# Patient Record
(demographics unavailable — no encounter records)

---

## 2025-02-03 NOTE — DISCUSSION/SUMMARY
[FreeTextEntry1] : per MOC She tried to get ENDO appt in the past for PMH elevated Hbb A1c  but got a "run around" so she stopped pursuing Advised ENDO ASAP and call us if unable to get appt in next 2 weeks

## 2025-05-23 NOTE — REASON FOR VISIT
Attending Attestation (For Attendings USE Only)...
[Consultation] : a consultation visit
[Consultation] : a consultation visit

## 2025-05-27 NOTE — CONSULT LETTER
[Please see my note below.] : Please see my note below. [Consult Closing:] : Thank you very much for allowing me to participate in the care of this patient.  If you have any questions, please do not hesitate to contact me. [Sincerely,] : Sincerely, [FreeTextEntry1] : Trupti Shi MD [FreeTextEntry3] : Chioma Montiel MD

## 2025-05-27 NOTE — HISTORY OF PRESENT ILLNESS
[Polyuria] : no polyuria [Polydipsia] : no polydipsia [FreeTextEntry2] : Lawrence is a 13 year 3 month boy, here with his mother, with a family history of type 2 diabetes, with elevated Hgb A1C.  His last Hgb A1C was 6.1% in 2/2025.  He has not made any changes since then.  A diet recall was made and as follows: Breakfast:   Eggs with pancakes/waffles, potatoes, tea or OJ Lunch:  ~ 2P ramirez chicken, lamb chicken, pasta, water Dinner:  cereal (Cheerios), sandwich, pasta Beverages:   mostly water, occasional soda For physical activity, he plays at school.   There are no concerns about heavy snoring.  He has normal energy levels. Of note, Lawrence was diagnosed with iron deficiency anemia.  He took iron briefly and has not been taking iron and tried to adjust diet.

## 2025-05-27 NOTE — ASSESSMENT
[FreeTextEntry1] : Sanjay is a 13 year 3 month old adolescent with insulin resistance, elevated BMI and Hgb A1C of 5.8% down from 6.1% in 2/2025.  I explained to SANJAY and his mother the concept of insulin resistance and methods to improve insulin sensitivity.  This includes lowering the glycemic index and cardiovascular activity minimum 30 minutes, 5x/week. We discussed comorbidities of insulin resistance, such as dyslipidemia and nonalcoholic fatty liver disease.   I advised meeting with dietician now and follow up with me in 4 months.  Labs will be checked at that visit.

## 2025-07-30 NOTE — DISCUSSION/SUMMARY
[FreeTextEntry1] : 14 yo M here for WCC. BMI at 98th percentile. Followed by endo for elevated A1c, pending re-eval, working on lifestyle changes. Hx of mild anemia- will repeat labs as below and decide if needed to restart irone supplementation.  Due for routine labs:  CBC, CMP, lipid panel.  Parent understating importance of labs, will take child soon for blood draw. Will phone with results when available.  Due for _HPV vaccines today. After discussing risks/ benefits, parent in agreement with administration.  VIS given.  Counseling: -Continue balanced diet with all food groups. Discussed AAP 5210.  ZERO sugary beverages.  Encourage physical activity.  -Brush teeth twice a day with toothbrush. Recommend visit to dentist.  -Maintain consistent daily routines and sleep schedule.  -School discussed.  -Personal hygiene, puberty, and sexual health reviewed.  Risky behaviors assessed.  -Use consistent, positive discipline.  -Limit screen time to no more than 2 hours per day.   Return 1 year for routine well child check.

## 2025-07-30 NOTE — HISTORY OF PRESENT ILLNESS
[Yes] : Patient goes to dentist yearly [Vitamin] : Primary Fluoride Source: Vitamin [Eats meals with family] : eats meals with family [Has family members/adults to turn to for help] : has family members/adults to turn to for help [Is permitted and is able to make independent decisions] : Is permitted and is able to make independent decisions [Grade: ____] : Grade: [unfilled] [Normal Performance] : normal performance [Normal Behavior/Attention] : normal behavior/attention [Normal Homework] : normal homework [Eats regular meals including adequate fruits and vegetables] : eats regular meals including adequate fruits and vegetables [Drinks non-sweetened liquids] : drinks non-sweetened liquids  [Calcium source] : calcium source [Has friends] : has friends [Has interests/participates in community activities/volunteers] : has interests/participates in community activities/volunteers. [de-identified] : Only 1x daily [Mother] : mother [Toothpaste] : Primary Fluoride Source: Toothpaste [Sleep Concerns] : no sleep concerns [Has concerns about body or appearance] : does not have concerns about body or appearance [At least 1 hour of physical activity a day] : does not do at least 1 hour of physical activity a day [Screen time (except homework) less than 2 hours a day] : no screen time (except homework) less than 2 hours a day [Uses electronic nicotine delivery system] : does not use electronic nicotine delivery system [Uses tobacco] : does not use tobacco [Uses drugs] : does not use drugs  [Drinks alcohol] : does not drink alcohol [Uses safety belts/safety equipment] : uses safety belts/safety equipment  [Impaired/distracted driving] : no impaired/distracted driving [Has peer relationships free of violence] : has peer relationships free of violence [No] : Patient has not had sexual intercourse [HIV Screening Declined] : HIV Screening Declined [Has ways to cope with stress] : has ways to cope with stress [Displays self-confidence] : displays self-confidence [Has problems with sleep] : does not have problems with sleep [Gets depressed, anxious, or irritable/has mood swings] : does not get depressed, anxious, or irritable/has mood swings [Has thought about hurting self or considered suicide] : has not thought about hurting self or considered suicide [de-identified] : Aunt [FreeTextEntry7] : Followed by endo for elevated A1c, recent bloodwork showing anemia and low iron

## 2025-07-30 NOTE — RISK ASSESSMENT
[0] : 2) Feeling down, depressed, or hopeless: Not at all (0) [PHQ-2 Negative - No further assessment needed] : PHQ-2 Negative - No further assessment needed [PHQ-9 Negative - No further assessment needed] : PHQ-9 Negative - No further assessment needed [No Increased risk of SCA or SCD] : No Increased risk of SCA or SCD    [[id number]] : 0 [Have you ever fainted, passed out or had an unexplained seizure suddenly and without warning, especially during exercise or in response] : Have you ever fainted, passed out or had an unexplained seizure suddenly and without warning, especially during exercise or in response to sudden loud noises such as doorbells, alarm clocks and ringing telephones? No [Have you ever had exercise-related chest pain or shortness of breath?] : Have you ever had exercise-related chest pain or shortness of breath? No [Has anyone in your immediate family (parents, grandparents, siblings) or other more distant relatives (aunts, uncles, cousins)  of heart] : Has anyone in your immediate family (parents, grandparents, siblings) or other more distant relatives (aunts, uncles, cousins)  of heart problems or had an unexpected sudden death before age 50 (This would include unexpected drownings, unexplained car accidents in which the relative was driving or sudden infant death syndrome.)? No [Are you related to anyone with hypertrophic cardiomyopathy or hypertrophic obstructive cardiomyopathy, Marfan syndrome, arrhythmogenic] : Are you related to anyone with hypertrophic cardiomyopathy or hypertrophic obstructive cardiomyopathy, Marfan syndrome, arrhythmogenic right ventricular cardiomyopathy, long QT syndrome, short QT syndrome, Brugada syndrome or catecholaminergic polymorphic ventricular tachycardia, or anyone younger than 50 years with a pacemaker or implantable defibrillator? No [Yes] : Discussed with patient. [No] : Patient does not consent to screening.

## 2025-07-30 NOTE — HISTORY OF PRESENT ILLNESS
[Yes] : Patient goes to dentist yearly [Vitamin] : Primary Fluoride Source: Vitamin [Eats meals with family] : eats meals with family [Has family members/adults to turn to for help] : has family members/adults to turn to for help [Is permitted and is able to make independent decisions] : Is permitted and is able to make independent decisions [Grade: ____] : Grade: [unfilled] [Normal Performance] : normal performance [Normal Behavior/Attention] : normal behavior/attention [Normal Homework] : normal homework [Eats regular meals including adequate fruits and vegetables] : eats regular meals including adequate fruits and vegetables [Drinks non-sweetened liquids] : drinks non-sweetened liquids  [Calcium source] : calcium source [Has friends] : has friends [Has interests/participates in community activities/volunteers] : has interests/participates in community activities/volunteers. [de-identified] : Only 1x daily [Mother] : mother [Toothpaste] : Primary Fluoride Source: Toothpaste [Sleep Concerns] : no sleep concerns [Has concerns about body or appearance] : does not have concerns about body or appearance [At least 1 hour of physical activity a day] : does not do at least 1 hour of physical activity a day [Screen time (except homework) less than 2 hours a day] : no screen time (except homework) less than 2 hours a day [Uses electronic nicotine delivery system] : does not use electronic nicotine delivery system [Uses tobacco] : does not use tobacco [Uses drugs] : does not use drugs  [Drinks alcohol] : does not drink alcohol [Uses safety belts/safety equipment] : uses safety belts/safety equipment  [Impaired/distracted driving] : no impaired/distracted driving [Has peer relationships free of violence] : has peer relationships free of violence [No] : Patient has not had sexual intercourse [HIV Screening Declined] : HIV Screening Declined [Has ways to cope with stress] : has ways to cope with stress [Displays self-confidence] : displays self-confidence [Has problems with sleep] : does not have problems with sleep [Gets depressed, anxious, or irritable/has mood swings] : does not get depressed, anxious, or irritable/has mood swings [Has thought about hurting self or considered suicide] : has not thought about hurting self or considered suicide [de-identified] : Aunt [FreeTextEntry7] : Followed by endo for elevated A1c, recent bloodwork showing anemia and low iron

## 2025-07-30 NOTE — HISTORY OF PRESENT ILLNESS
[Yes] : Patient goes to dentist yearly [Vitamin] : Primary Fluoride Source: Vitamin [Eats meals with family] : eats meals with family [Has family members/adults to turn to for help] : has family members/adults to turn to for help [Is permitted and is able to make independent decisions] : Is permitted and is able to make independent decisions [Grade: ____] : Grade: [unfilled] [Normal Performance] : normal performance [Normal Behavior/Attention] : normal behavior/attention [Normal Homework] : normal homework [Eats regular meals including adequate fruits and vegetables] : eats regular meals including adequate fruits and vegetables [Drinks non-sweetened liquids] : drinks non-sweetened liquids  [Calcium source] : calcium source [Has friends] : has friends [Has interests/participates in community activities/volunteers] : has interests/participates in community activities/volunteers. [de-identified] : Only 1x daily [Mother] : mother [Toothpaste] : Primary Fluoride Source: Toothpaste [Sleep Concerns] : no sleep concerns [Has concerns about body or appearance] : does not have concerns about body or appearance [At least 1 hour of physical activity a day] : does not do at least 1 hour of physical activity a day [Screen time (except homework) less than 2 hours a day] : no screen time (except homework) less than 2 hours a day [Uses electronic nicotine delivery system] : does not use electronic nicotine delivery system [Uses tobacco] : does not use tobacco [Uses drugs] : does not use drugs  [Drinks alcohol] : does not drink alcohol [Uses safety belts/safety equipment] : uses safety belts/safety equipment  [Impaired/distracted driving] : no impaired/distracted driving [Has peer relationships free of violence] : has peer relationships free of violence [No] : Patient has not had sexual intercourse [HIV Screening Declined] : HIV Screening Declined [Has ways to cope with stress] : has ways to cope with stress [Displays self-confidence] : displays self-confidence [Has problems with sleep] : does not have problems with sleep [Gets depressed, anxious, or irritable/has mood swings] : does not get depressed, anxious, or irritable/has mood swings [Has thought about hurting self or considered suicide] : has not thought about hurting self or considered suicide [de-identified] : Aunt [FreeTextEntry7] : Followed by endo for elevated A1c, recent bloodwork showing anemia and low iron

## 2025-07-30 NOTE — PHYSICAL EXAM

## 2025-07-30 NOTE — DISCUSSION/SUMMARY
[FreeTextEntry1] : 12 yo M here for WCC. BMI at 98th percentile. Followed by endo for elevated A1c, pending re-eval, working on lifestyle changes. Hx of mild anemia- will repeat labs as below and decide if needed to restart irone supplementation.  Due for routine labs:  CBC, CMP, lipid panel.  Parent understating importance of labs, will take child soon for blood draw. Will phone with results when available.  Due for _HPV vaccines today. After discussing risks/ benefits, parent in agreement with administration.  VIS given.  Counseling: -Continue balanced diet with all food groups. Discussed AAP 5210.  ZERO sugary beverages.  Encourage physical activity.  -Brush teeth twice a day with toothbrush. Recommend visit to dentist.  -Maintain consistent daily routines and sleep schedule.  -School discussed.  -Personal hygiene, puberty, and sexual health reviewed.  Risky behaviors assessed.  -Use consistent, positive discipline.  -Limit screen time to no more than 2 hours per day.   Return 1 year for routine well child check.

## 2025-07-30 NOTE — RISK ASSESSMENT
[0] : 2) Feeling down, depressed, or hopeless: Not at all (0) [PHQ-2 Negative - No further assessment needed] : PHQ-2 Negative - No further assessment needed [PHQ-9 Negative - No further assessment needed] : PHQ-9 Negative - No further assessment needed [No Increased risk of SCA or SCD] : No Increased risk of SCA or SCD    [[id number]] : 0 [Have you ever fainted, passed out or had an unexplained seizure suddenly and without warning, especially during exercise or in response] : Have you ever fainted, passed out or had an unexplained seizure suddenly and without warning, especially during exercise or in response to sudden loud noises such as doorbells, alarm clocks and ringing telephones? No [Have you ever had exercise-related chest pain or shortness of breath?] : Have you ever had exercise-related chest pain or shortness of breath? No [Has anyone in your immediate family (parents, grandparents, siblings) or other more distant relatives (aunts, uncles, cousins)  of heart] : Has anyone in your immediate family (parents, grandparents, siblings) or other more distant relatives (aunts, uncles, cousins)  of heart problems or had an unexpected sudden death before age 50 (This would include unexpected drownings, unexplained car accidents in which the relative was driving or sudden infant death syndrome.)? No [Are you related to anyone with hypertrophic cardiomyopathy or hypertrophic obstructive cardiomyopathy, Marfan syndrome, arrhythmogenic] : Are you related to anyone with hypertrophic cardiomyopathy or hypertrophic obstructive cardiomyopathy, Marfan syndrome, arrhythmogenic right ventricular cardiomyopathy, long QT syndrome, short QT syndrome, Brugada syndrome or catecholaminergic polymorphic ventricular tachycardia, or anyone younger than 50 years with a pacemaker or implantable defibrillator? No [Yes] : Discussed with patient. [No] : Patient does not consent to screening.

## 2025-07-30 NOTE — PHYSICAL EXAM
